# Patient Record
Sex: FEMALE | URBAN - METROPOLITAN AREA
[De-identification: names, ages, dates, MRNs, and addresses within clinical notes are randomized per-mention and may not be internally consistent; named-entity substitution may affect disease eponyms.]

---

## 2020-06-09 ENCOUNTER — OFFICE VISIT (OUTPATIENT)
Dept: URBAN - METROPOLITAN AREA CLINIC 58 | Facility: CLINIC | Age: 70
End: 2020-06-09
Payer: COMMERCIAL

## 2020-06-09 DIAGNOSIS — E11.9 TYPE 2 DIABETES MELLITUS W/O COMPLICATION: ICD-10-CM

## 2020-06-09 DIAGNOSIS — H35.3131 NONEXUDATIVE AGE-RELATED MACULAR DEGENERATION, BILATERAL, EARLY DRY STAGE: ICD-10-CM

## 2020-06-09 DIAGNOSIS — H25.13 AGE-RELATED NUCLEAR CATARACT, BILATERAL: Primary | ICD-10-CM

## 2020-06-09 DIAGNOSIS — H17.89 OTHER CORNEAL SCARS AND OPACITIES: ICD-10-CM

## 2020-06-09 PROCEDURE — 99204 OFFICE O/P NEW MOD 45 MIN: CPT | Performed by: OPHTHALMOLOGY

## 2020-06-09 ASSESSMENT — VISUAL ACUITY
OS: 20/40
OD: 20/20

## 2020-06-09 ASSESSMENT — KERATOMETRY
OS: 42.63
OD: 43.13

## 2020-06-09 ASSESSMENT — INTRAOCULAR PRESSURE
OD: 15
OS: 16

## 2020-06-09 NOTE — IMPRESSION/PLAN
Impression: Type 2 diabetes mellitus w/o complication: R69.2. Plan: Diabetes type II: no background retinopathy, no signs of neovascularization noted. Discussed ocular and systemic benefits of blood sugar control.

## 2020-06-09 NOTE — IMPRESSION/PLAN
Impression: Nonexudative age-related macular degeneration, bilateral, early dry stage: H35.3131. Plan: Discussed diagnosis in detail with patient. Advised patient of condition. Consult recommended [[facility]] for further eval prior to cataract surgery.

## 2020-06-09 NOTE — IMPRESSION/PLAN
Impression: Other corneal scars and opacities: H17.89. Plan: Discussed at length the decreased predictability of IOL calculations and the resulting glasses Rx after CEIOL. Patient understands that glasses or contacts may be needed even after the surgery, discussed that no guarantee of refractive result can be given.